# Patient Record
Sex: FEMALE | Race: WHITE | NOT HISPANIC OR LATINO | Employment: FULL TIME | URBAN - METROPOLITAN AREA
[De-identification: names, ages, dates, MRNs, and addresses within clinical notes are randomized per-mention and may not be internally consistent; named-entity substitution may affect disease eponyms.]

---

## 2017-12-05 ENCOUNTER — TELEPHONE (OUTPATIENT)
Dept: GASTROENTEROLOGY | Facility: CLINIC | Age: 27
End: 2017-12-05

## 2017-12-05 NOTE — TELEPHONE ENCOUNTER
----- Message from Silva Srivastava sent at 12/5/2017 12:42 PM CST -----  Contact: pt#[phone]  Pt is calling to schedule a consultation. Please call

## 2019-02-11 ENCOUNTER — OFFICE VISIT (OUTPATIENT)
Dept: UROGYNECOLOGY | Facility: CLINIC | Age: 29
End: 2019-02-11
Payer: OTHER GOVERNMENT

## 2019-02-11 VITALS
HEIGHT: 63 IN | DIASTOLIC BLOOD PRESSURE: 78 MMHG | WEIGHT: 155 LBS | SYSTOLIC BLOOD PRESSURE: 124 MMHG | BODY MASS INDEX: 27.46 KG/M2 | HEART RATE: 78 BPM

## 2019-02-11 DIAGNOSIS — K59.01 SLOW TRANSIT CONSTIPATION: ICD-10-CM

## 2019-02-11 DIAGNOSIS — N81.89 PELVIC FLOOR WEAKNESS: ICD-10-CM

## 2019-02-11 DIAGNOSIS — N81.4 UTERINE PROLAPSE: ICD-10-CM

## 2019-02-11 DIAGNOSIS — N81.10 CYSTOCELE WITH RECTOCELE: ICD-10-CM

## 2019-02-11 DIAGNOSIS — N81.6 CYSTOCELE WITH RECTOCELE: ICD-10-CM

## 2019-02-11 DIAGNOSIS — R35.0 URINARY FREQUENCY: Primary | ICD-10-CM

## 2019-02-11 LAB
BILIRUB SERPL-MCNC: ABNORMAL MG/DL
BLOOD URINE, POC: ABNORMAL
COLOR, POC UA: ABNORMAL
GLUCOSE UR QL STRIP: ABNORMAL
KETONES UR QL STRIP: ABNORMAL
LEUKOCYTE ESTERASE URINE, POC: ABNORMAL
NITRITE, POC UA: ABNORMAL
PH, POC UA: 8
PROTEIN, POC: ABNORMAL
SPECIFIC GRAVITY, POC UA: 1
UROBILINOGEN, POC UA: ABNORMAL

## 2019-02-11 PROCEDURE — 81002 URINALYSIS NONAUTO W/O SCOPE: CPT | Mod: PBBFAC,PO | Performed by: OBSTETRICS & GYNECOLOGY

## 2019-02-11 PROCEDURE — 99204 PR OFFICE/OUTPT VISIT, NEW, LEVL IV, 45-59 MIN: ICD-10-PCS | Mod: S$PBB,,, | Performed by: OBSTETRICS & GYNECOLOGY

## 2019-02-11 PROCEDURE — 99213 OFFICE O/P EST LOW 20 MIN: CPT | Mod: PBBFAC,PO | Performed by: OBSTETRICS & GYNECOLOGY

## 2019-02-11 PROCEDURE — 99999 PR PBB SHADOW E&M-EST. PATIENT-LVL III: ICD-10-PCS | Mod: PBBFAC,,, | Performed by: OBSTETRICS & GYNECOLOGY

## 2019-02-11 PROCEDURE — 99999 PR PBB SHADOW E&M-EST. PATIENT-LVL III: CPT | Mod: PBBFAC,,, | Performed by: OBSTETRICS & GYNECOLOGY

## 2019-02-11 PROCEDURE — 99204 OFFICE O/P NEW MOD 45 MIN: CPT | Mod: S$PBB,,, | Performed by: OBSTETRICS & GYNECOLOGY

## 2019-02-11 RX ORDER — LIOTHYRONINE SODIUM 25 UG/1
2.5 TABLET ORAL DAILY
COMMUNITY

## 2019-02-11 RX ORDER — LEVOTHYROXINE SODIUM 125 UG/1
CAPSULE ORAL
COMMUNITY

## 2019-02-11 NOTE — PROGRESS NOTES
Subjective:     Chief Complaint:  Multiple  History of Present Illness: Sara Carter is a 29 y.o. female who presents for 1) urinary frequency.  This started after delivery.  She voids every 1-2 hours.  It is worse when she drinks coffee or tea and worse around her menstrual periods.2) pressure like sensation and feeling that something is falling out during her menstrual period.  She feels something externally at the introitus.  She does not have much cramping however.  She uses natural birth control methods and is not on birth control pills.  Her  just had a resected  .  Tampons did not lessen the sensation. 3) she had a difficult delivery with hemorrhage and since then she says her cervix feels very low and she notices it significantly when she puts tampons in so she feels it is worse around her menses. 4) severe constipation she is on Linzess with minimal results.  Lactulose give her gas.  She said she has easier bowel movements during her periods  She had an upper endoscopy but requested her GI doctor do a colonoscopy.  He declined and she questioned whether this would be beneficial.  Her urinalysis today is negative and she has no history of recent UTIs or significant incontinence unless her bladder is very full    Review of Systems    Constitutional:  Celiac disease  Eyes: No vision changes.  ENT: No headaches.   Respiratory: No SOB.  Cardiovascular: No chest pain. No edema.   Gastrointestinal:  constipation.   Genitourinary:  Regular.  Integument/Breast: Negative  Hematologic/Lymphatic: No history of anemia.  Musculoskeletal: No back pain. No abdominal pain.  Neurological: No known disc problems. No paresthesias.  Behavioral/Psych:  Postpartum depression.   Endocrine:  Hashimoto's  Allergy/Immune: no recent reactions     Objective:   General Exam:  General appearance: WDNF. NAD.   HEENT: Regi. EOM's intact.  Neck: Normal thyroid.   Back: No CVA tenderness.  RESP: No SOB.  Breasts:  deferred  Abdomen: Benign without localizing signs.  Extremities: No edema. No varices.  Lymphatic: noncontributory  Skin: No rashes. No lesions.  Neurologic: Intact.   Psych: Oriented.   Pelvic Exam:  V:  No lesions. No palpable nodes.  Enlarged posterior hiatus was slightly depressed perineal body  Va:No d/c bleeding or lesions.  Dominant full length rectocele in the supine position with what appears to be good anterior support.  However standing the bladder protrudes to the introitus.  Again with elevation of bladder and rectum she has increased uterine prolapse  .Meatus:No caruncle or stenosis  Urethra: Non tender. No suburethral masses.  Mild hypermobility  Cx/Cuff: Normal and non friable.  Descends to about a minus 3 position in the standing position with elevation of the bladder and the rectum  Uterus:  Nontender with no masses  Ad: No mass or tenderness.  Levators :Symmetrical. Normal tone and bulk. Non tender.3/5 contractions but somewhat sluggish contractions  BL: Non tender  RV:  A large amount of very hard stool in the rectum  Assessment:   Diffuse relaxation with dominant rectocele in the supine position but significant worsening of the cystocele standing-we discussed the purpose of trying conservative techniques but I explained to her that in the long run she will eventually likely require surgery  Chronic constipation.  We had a lengthy discussion of how much the constipation will aggravate the rectocele verses the rectocele aggravating the constipation.  Because of the amount of hard stool in the rectum this appears to be more of a motility related constipation that it does stool hanging up in the rectocele.  I explained the reasons why colonoscopy we probably would not be beneficial in evaluating her constipation  She has fair pelvic floor contractions but strengthen the pelvic floor would definitely be of benefit       Plan:    She was instructed on home pelvic floor exercises and after she is done  them for a few weeks she will return to be evaluated to see if there is improvement.  If she has fairly good prompt contractions and she would likely do well with continued home exercises long she has encouragement.  If not she probably should start physical therapy   She should continue the Linzess but also should probably have a stool softener in order to increase the moisture in the stool to aid in evacuation  Motrin or Aleve the 1st few days of her period to see if reducing prostaglandins and perhaps swelling will reduce her symptoms even though she does not have significant dysmenorrhea-if this is not effective she may benefit from a diuretic taken at the time of her periods   a diagnostic trial of a ring pessary particularly at the time of her period may be beneficial in determining how many  of her symptoms are due to her prolapse

## 2019-03-12 ENCOUNTER — OFFICE VISIT (OUTPATIENT)
Dept: UROGYNECOLOGY | Facility: CLINIC | Age: 29
End: 2019-03-12
Payer: OTHER GOVERNMENT

## 2019-03-12 VITALS
HEART RATE: 67 BPM | HEIGHT: 63 IN | BODY MASS INDEX: 28.24 KG/M2 | WEIGHT: 159.38 LBS | SYSTOLIC BLOOD PRESSURE: 118 MMHG | DIASTOLIC BLOOD PRESSURE: 80 MMHG

## 2019-03-12 DIAGNOSIS — R35.0 URINARY FREQUENCY: Primary | ICD-10-CM

## 2019-03-12 DIAGNOSIS — N81.11 CYSTOCELE, MIDLINE: ICD-10-CM

## 2019-03-12 DIAGNOSIS — N81.4 UTERINE PROLAPSE: ICD-10-CM

## 2019-03-12 DIAGNOSIS — N81.89 PELVIC FLOOR WEAKNESS: ICD-10-CM

## 2019-03-12 DIAGNOSIS — K59.01 SLOW TRANSIT CONSTIPATION: ICD-10-CM

## 2019-03-12 DIAGNOSIS — N81.6 RECTOCELE: ICD-10-CM

## 2019-03-12 PROCEDURE — 99213 OFFICE O/P EST LOW 20 MIN: CPT | Mod: S$PBB,,, | Performed by: NURSE PRACTITIONER

## 2019-03-12 PROCEDURE — 99213 PR OFFICE/OUTPT VISIT, EST, LEVL III, 20-29 MIN: ICD-10-PCS | Mod: S$PBB,,, | Performed by: NURSE PRACTITIONER

## 2019-03-12 PROCEDURE — 99999 PR PBB SHADOW E&M-EST. PATIENT-LVL III: ICD-10-PCS | Mod: PBBFAC,,, | Performed by: NURSE PRACTITIONER

## 2019-03-12 PROCEDURE — 99213 OFFICE O/P EST LOW 20 MIN: CPT | Mod: PBBFAC,PO | Performed by: NURSE PRACTITIONER

## 2019-03-12 PROCEDURE — 99999 PR PBB SHADOW E&M-EST. PATIENT-LVL III: CPT | Mod: PBBFAC,,, | Performed by: NURSE PRACTITIONER

## 2019-03-12 RX ORDER — POLYETHYLENE GLYCOL 3350 17 G/17G
POWDER, FOR SOLUTION ORAL
COMMUNITY
Start: 2018-12-18

## 2019-03-12 RX ORDER — CETIRIZINE HYDROCHLORIDE 10 MG/1
TABLET ORAL
COMMUNITY
Start: 2019-01-04

## 2019-03-12 NOTE — PROGRESS NOTES
Subjective:       Patient ID: Sara Solorio is a 29 y.o. female.    Chief Complaint: pelvic floor eveluation    HPI  Sara Solorio is a 29 y.o. female who presents today for evaluation of pelvic floor contractions.  She has been doing home pelvic floor exercises.  She feels that she is doing well getting the exercises in and getting into a routine of doing them.  However, she does not feel that the exercises have changed her symptoms at all.  She denies any real complaints, but has multiple questions about the surgery andwhat she should do.    Review of Systems   Constitutional: Negative for activity change, fever and unexpected weight change.   HENT: Negative for hearing loss.    Eyes: Negative for visual disturbance.   Respiratory: Negative for shortness of breath and wheezing.    Cardiovascular: Negative for chest pain, palpitations and leg swelling.   Gastrointestinal: Negative for abdominal pain, constipation and diarrhea.   Genitourinary: Positive for frequency. Negative for dyspareunia, dysuria, urgency, vaginal bleeding and vaginal discharge.   Musculoskeletal: Negative for gait problem and neck pain.   Skin: Negative for rash and wound.   Allergic/Immunologic: Negative for immunocompromised state.   Neurological: Negative for tremors, speech difficulty and weakness.   Hematological: Does not bruise/bleed easily.   Psychiatric/Behavioral: Negative for agitation and confusion.       Objective:      Physical Exam   Constitutional: She is oriented to person, place, and time. She appears well-developed and well-nourished. No distress.   HENT:   Head: Normocephalic and atraumatic.   Neck: Neck supple. No thyromegaly present.   Pulmonary/Chest: Effort normal. No respiratory distress.   Abdominal: Soft. There is no tenderness. No hernia.   Musculoskeletal: Normal range of motion.   Neurological: She is alert and oriented to person, place, and time.   Skin: Skin is warm and dry. No rash noted.   Psychiatric:  She has a normal mood and affect. Her behavior is normal.     Pelvic Exam:  Deferred at this time.  Pt is interested in pelvic floor physical therapy and exam will be performed at the first session.    Assessment:       1. Urinary frequency    2. Uterine prolapse    3. Cystocele, midline    4. Rectocele    5. Pelvic floor weakness    6. Slow transit constipation        Plan:       Urinary frequency- pt will begin pelvic floor physical therapy.  She is still considering surgical repair, but is not sure if she would like to have surgery at this time.    Uterine prolapse- as noted above    Cystocele, midline- as noted above    Rectocele- as noted above    Pelvic floor weakness-as noted above    Slow transit constipation- as noted above    RTC 2 weeks for pelvic floor PT

## 2019-03-13 ENCOUNTER — PATIENT MESSAGE (OUTPATIENT)
Dept: UROGYNECOLOGY | Facility: CLINIC | Age: 29
End: 2019-03-13